# Patient Record
Sex: FEMALE | Race: WHITE | NOT HISPANIC OR LATINO | Employment: UNEMPLOYED | URBAN - METROPOLITAN AREA
[De-identification: names, ages, dates, MRNs, and addresses within clinical notes are randomized per-mention and may not be internally consistent; named-entity substitution may affect disease eponyms.]

---

## 2023-03-15 ENCOUNTER — HOSPITAL ENCOUNTER (EMERGENCY)
Facility: HOSPITAL | Age: 53
Discharge: HOME | End: 2023-03-15
Attending: EMERGENCY MEDICINE
Payer: MEDICAID

## 2023-03-15 VITALS
WEIGHT: 153.69 LBS | OXYGEN SATURATION: 96 % | HEIGHT: 66 IN | BODY MASS INDEX: 24.7 KG/M2 | RESPIRATION RATE: 18 BRPM | HEART RATE: 96 BPM | DIASTOLIC BLOOD PRESSURE: 91 MMHG | TEMPERATURE: 98 F | SYSTOLIC BLOOD PRESSURE: 153 MMHG

## 2023-03-15 DIAGNOSIS — K08.89 PAIN, DENTAL: Primary | ICD-10-CM

## 2023-03-15 PROCEDURE — 99284 EMERGENCY DEPT VISIT MOD MDM: CPT

## 2023-03-15 RX ORDER — CLINDAMYCIN HYDROCHLORIDE 150 MG/1
450 CAPSULE ORAL 3 TIMES DAILY
Qty: 63 CAPSULE | Refills: 0 | Status: SHIPPED | OUTPATIENT
Start: 2023-03-15 | End: 2023-03-22

## 2023-03-15 RX ORDER — TRAMADOL HYDROCHLORIDE 50 MG/1
50 TABLET ORAL EVERY 6 HOURS PRN
Qty: 12 TABLET | Refills: 0 | Status: SHIPPED | OUTPATIENT
Start: 2023-03-15

## 2023-03-15 NOTE — ED PROVIDER NOTES
HISTORY     Chief Complaint   Patient presents with    Dental Pain     R side dental abscess.     Review of patient's allergies indicates:   Allergen Reactions    Benadryl [diphenhydramine hcl]      seizures    Calamine      hives    Latex, natural rubber      hives    Red dye      seizures    Shellfish containing products      seizure        HPI   53-year-old female presents the emergency department for right upper and right lower dental pain.  Patient reports pain has been ongoing times several days and has gradually worsened.  Patient denies any previous treatment.  Patient denies any fever, chills, chest pain, shortness of breath, back pain, abdominal pain, nausea, vomiting, trismus, and all other concerns at this time.    The history is provided by the patient. No  was used.      PCP: CHLOE Guzman     Past Medical History:  Past Medical History:   Diagnosis Date    ADHD (attention deficit hyperactivity disorder)     Anemia     Headache(784.0)     Nervousness     Seizures         Past Surgical History:  Past Surgical History:   Procedure Laterality Date     SECTION, LOW TRANSVERSE      CHOLECYSTECTOMY      HYSTERECTOMY          Family History:  Family History   Problem Relation Age of Onset    Neuropathy Mother     Stroke Mother     Diabetes Brother         Social History:  Social History     Tobacco Use    Smoking status: Every Day     Packs/day: 0.50     Years: 25.00     Pack years: 12.50     Types: Cigarettes    Smokeless tobacco: Not on file   Substance and Sexual Activity    Alcohol use: No    Drug use: No    Sexual activity: Yes     Partners: Male         ROS   Review of Systems   Constitutional:  Negative for fever.   HENT:  Positive for dental problem. Negative for sore throat.    Respiratory:  Negative for shortness of breath.    Cardiovascular:  Negative for chest pain.   Gastrointestinal:  Negative for abdominal pain, nausea and vomiting.   Genitourinary:   "Negative for dysuria.   Musculoskeletal:  Negative for back pain.   Skin:  Negative for rash.   Neurological:  Negative for weakness.   Hematological:  Does not bruise/bleed easily.     PHYSICAL EXAM     Initial Vitals [03/15/23 1220]   BP Pulse Resp Temp SpO2   (!) 153/91 96 18 98 °F (36.7 °C) 96 %      MAP       --           Physical Exam    Nursing note and vitals reviewed.  Constitutional: She appears well-developed and well-nourished. She is not diaphoretic. No distress.   HENT:   Head: Normocephalic and atraumatic.   Mild swelling noted to the gum tissue around the right upper and right lower molar areas.  There is no drainage.  There is no trismus.  Airway open and patent.   Eyes: Right eye exhibits no discharge. Left eye exhibits no discharge.   Neck: Neck supple.   Normal range of motion.  Cardiovascular:  Normal rate.           Pulmonary/Chest: No respiratory distress.   Abdominal: She exhibits no distension.   Musculoskeletal:         General: Normal range of motion.      Cervical back: Normal range of motion and neck supple.     Neurological: She is alert and oriented to person, place, and time. She has normal strength.   Skin: Skin is warm and dry.   Psychiatric: She has a normal mood and affect. Her behavior is normal. Thought content normal.        ED COURSE   Procedures  ED ONGOING VITALS:  Vitals:    03/15/23 1220   BP: (!) 153/91   Pulse: 96   Resp: 18   Temp: 98 °F (36.7 °C)   TempSrc: Oral   SpO2: 96%   Weight: 69.7 kg (153 lb 10.6 oz)   Height: 5' 6" (1.676 m)         ABNORMAL LAB VALUES:  Labs Reviewed - No data to display      ALL LAB VALUES:  none      RADIOLOGY STUDIES:  Imaging Results    None                   The above vital signs and test results have been reviewed by the emergency provider.     ED Medications:  Discharge Medication List as of 3/15/2023 12:51 PM        START taking these medications    Details   clindamycin (CLEOCIN) 150 MG capsule Take 3 capsules (450 mg total) by mouth " 3 (three) times daily. for 7 days, Starting Wed 3/15/2023, Until Wed 3/22/2023, Print      traMADoL (ULTRAM) 50 mg tablet Take 1 tablet (50 mg total) by mouth every 6 (six) hours as needed for Pain., Starting Wed 3/15/2023, Print           Discharge Medications:  Discharge Medication List as of 3/15/2023 12:51 PM        START taking these medications    Details   clindamycin (CLEOCIN) 150 MG capsule Take 3 capsules (450 mg total) by mouth 3 (three) times daily. for 7 days, Starting Wed 3/15/2023, Until Wed 3/22/2023, Print      traMADoL (ULTRAM) 50 mg tablet Take 1 tablet (50 mg total) by mouth every 6 (six) hours as needed for Pain., Starting Wed 3/15/2023, Print             Follow-up Information       Schedule an appointment as soon as possible for a visit  with Dentist of choice.               [facility] - Emergency Dept..    Specialty: Emergency Medicine  Why: If symptoms worsen  Contact information:  [id number] [place] [id number] [phone]                          I discussed with patient and/or family/caretaker that evaluation in the ED does not suggest any emergent or life threatening medical conditions requiring immediate intervention beyond what was provided in the ED, and I believe patient is safe for discharge. Regardless, an unremarkable evaluation in the ED does not preclude the development or presence of a serious or life threatening condition. As such, patient was instructed to return immediately for any worsening or change in current symptoms.        MEDICAL DECISION MAKING   Medical Decision Making:   Initial Assessment:   Patient presents the ER for dental pain  Differential Diagnosis:   Dental abscess  Dental caries  ED Management:  Patient to follow-up with dentist of choice and return to the ER for any worsening or concerns.             CLINICAL IMPRESSION       ICD-10-CM ICD-9-CM   1. Pain, dental  K08.89 525.9       Disposition:   Disposition:  Discharged  Condition: Stable       Tyson White, LEAH  03/15/23 [id number]

## 2023-03-15 NOTE — Clinical Note
"Isabel "Isabel" Karine was seen and treated in our emergency department on 3/15/2023.  She may return to work on 03/16/2023.       If you have any questions or concerns, please don't hesitate to call.      Tyson White NP"